# Patient Record
Sex: MALE | Race: WHITE | Employment: UNEMPLOYED | URBAN - METROPOLITAN AREA
[De-identification: names, ages, dates, MRNs, and addresses within clinical notes are randomized per-mention and may not be internally consistent; named-entity substitution may affect disease eponyms.]

---

## 2021-01-01 ENCOUNTER — HOSPITAL ENCOUNTER (EMERGENCY)
Age: 31
Discharge: HOME | End: 2021-01-01
Attending: EMERGENCY MEDICINE
Payer: MEDICARE

## 2021-01-01 VITALS
HEIGHT: 73 IN | WEIGHT: 165.79 LBS | BODY MASS INDEX: 21.97 KG/M2 | HEART RATE: 76 BPM | TEMPERATURE: 98.5 F | OXYGEN SATURATION: 100 % | DIASTOLIC BLOOD PRESSURE: 86 MMHG | SYSTOLIC BLOOD PRESSURE: 136 MMHG | RESPIRATION RATE: 16 BRPM

## 2021-01-01 DIAGNOSIS — K02.9 DENTAL CARIES: Primary | ICD-10-CM

## 2021-01-01 PROCEDURE — 99283 EMERGENCY DEPT VISIT LOW MDM: CPT

## 2021-01-01 RX ORDER — IBUPROFEN 600 MG/1
600 TABLET ORAL EVERY 8 HOURS PRN
Qty: 20 TABLET | Refills: 0 | Status: SHIPPED | OUTPATIENT
Start: 2021-01-01

## 2021-01-01 RX ORDER — CLINDAMYCIN HYDROCHLORIDE 300 MG/1
300 CAPSULE ORAL 3 TIMES DAILY
Qty: 30 CAPSULE | Refills: 0 | Status: SHIPPED | OUTPATIENT
Start: 2021-01-01 | End: 2021-01-11

## 2021-01-01 NOTE — ED NOTES
Walked pt from [facility] to ED bed. Obtained VS. Pt wearing mask, medic wearing mask, gloves, safety glasses.      Mina Dominguez, EMT-P  01/01/21 1116

## 2021-01-01 NOTE — ED NOTES
Pt wearing mask. Staff wearing procedural mask and eye protection (helmet or goggles) for staff protection-COVID 19    Pt had tooth that broke on left side about 1 month ago. Then states he pulled out a filling while flossing about 2 weeks ago. Not having pain because he thinks he had a root canal done. Pt does feel like he has had small pus pockets develop and drain around left upper and lower teeth recently. Pt states he went to [facility] dentist and was told that he needed lots of dental work done/fillings replaced. No antibiotics were prescribed. Has been taking ibuprofen. Reports some chills/feeling feverish, headaches. Has not had definite COVID exposure. Lots of COVID teaching and dental pain/dental care teaching with pt. Took ibuprofen earlier this am for HA/feeling feverish. Now afebrile.      Tiffany Corley RN  01/01/21 3686

## 2021-01-01 NOTE — ED PROVIDER NOTES
eMERGENCY dEPARTMENT eNCOUnter      Pt Name: Emma Alejandre  MRN: [id number]  [place] 1990  Date of evaluation: 1/1/2021  Provider: Fernandez Espinoza MD     [address] [place] [address]       Chief Complaint   Patient presents with    Dental Problem     Pt states infection to L upper molar x2wks, denies pain currently. HISTORY OF PRESENT ILLNESS   (Location/Symptom, Timing/Onset,Context/Setting, Quality, Duration, Modifying Factors, Severity) Note limiting factors. HPI    Emma Alejandre is a 31 y.o. male who presents to the emergency department with dental issues. Patient states he has root canals and one of the A filling came off while he was flossing on the left upper jaw and now he feels like there is some pus that is draining. He thinks he is has an infection. But no pain or swelling that he knows of. On the gum area on the inside on the left upper jaw there is some swelling and some mucus pus that drained. There has been no fever. Patient states he has no jaw pain. No swelling that he knows of. Patient request some antibiotics. Has been going on for couple weeks. Patient has a dental appointment in February. Patient has been seen by a dentist recently. Nursing Notes were reviewed. REVIEW OFSYSTEMS    (2+ for level 4; 10+ for level 5)   Review of Systems    General: No fevers, chills or night sweats, No weight loss    Head:  No Sore throat,  No Ear Pain. Positive drainage. Chest:  Nontender. No Cough, No SOB,  Chest Pain    GI: No abdominal pain or vomiting    : No dysuria or hematuria    Musculoskeletal: No unrelenting pain or night pain    Neurologic: No bowel or bladder incontinence, No saddle anesthesia, No leg weakness    All other systems reviewed and are negative. PAST MEDICAL HISTORY     Past Medical History:   Diagnosis Date    Hepatitis C        SURGICAL HISTORY     History reviewed. No pertinent surgical history.     CURRENT MEDICATIONS       Previous Medications ACETAMINOPHEN (TYLENOL) 325 MG TABLET    Take 650 mg by mouth every 6 hours as needed for Pain    IBUPROFEN (ADVIL;MOTRIN) 600 MG TABLET    Take 1 tablet by mouth every 6 hours as needed for Pain       ALLERGIES     Pcn [penicillins]    FAMILY HISTORY     History reviewed. No pertinent family history. SOCIAL HISTORY       Social History     Socioeconomic History    Marital status: Single     Spouse name: None    Number of children: None    Years of education: None    Highest education level: None   Occupational History    None   Social Needs    Financial resource strain: None    Food insecurity     Worry: None     Inability: None    Transportation needs     Medical: None     Non-medical: None   Tobacco Use    Smoking status: Current Some Day Smoker     Packs/day: 0.75     Types: Cigarettes    Smokeless tobacco: Never Used   Substance and Sexual Activity    Alcohol use: Yes     Comment: once a month    Drug use: Yes     Types: Methamphetamines     Comment: 1/1/21 last meth use 10 months    Sexual activity: None   Lifestyle    Physical activity     Days per week: None     Minutes per session: None    Stress: None   Relationships    Social connections     Talks on phone: None     Gets together: None     Attends Judaism service: None     Active member of club or organization: None     Attends meetings of clubs or organizations: None     Relationship status: None    Intimate partner violence     Fear of current or ex partner: None     Emotionally abused: None     Physically abused: None     Forced sexual activity: None   Other Topics Concern    None   Social History Narrative    None       SCREENINGS           PHYSICAL EXAM    (up to 7 for level 4, 8 or more for level 5)     ED Triage Vitals [01/01/21 1113]   BP Temp Temp Source Pulse Resp SpO2 Height Weight   136/86 98.5 °F (36.9 °C) Oral 76 16 100 % 6' 1\" (1.854 m) 165 lb 12.6 oz (75.2 kg)       Physical Exam    General: Alert and awake ×3. Nontoxic appearance. Well-developed well-nourished 69-year-old white male in no distress  HEENT: Normocephalic atraumatic. Neck is supple. Airway intact. No adenopathy. Patient has some good dentition with cavities noted. Also has a open cavity without any filling on the left upper jaw probably the second molar. In that area on the inner aspect of the mucosal there is a blister noted. It is near on the inner gum area. No pus that I can see at this time. Not tender. Uvula is midline. No abscess. Cardiac: Regular rate and rhythm with no murmurs rubs or gallops  Pulmonary: Lungs are clear in all lung fields. No wheezing. No Rales. Abdomen: Soft and nontender. Negative hepatosplenomegaly. Bowel sounds are active  Extremities: Moving all extremities. No calf tenderness. Peripheral pulses all intact  Skin: No skin lesions. No rashes  Neurologic: Cranial nerves II through XII was grossly intact. Nonfocal neurological exam  Psychiatric: Patient is pleasant. Mood is appropriate. DIAGNOSTIC RESULTS     EKG (Per Emergency Physician):       RADIOLOGY (Per Emergency Physician): Interpretation per the Radiologist below, if available at the time of this note:  No results found. ED BEDSIDE ULTRASOUND:   Performed by ED Physician - none    LABS:  Labs Reviewed - No data to display     All other labs were within normal range or not returned as of this dictation. Procedures      EMERGENCY DEPARTMENT COURSE and DIFFERENTIAL DIAGNOSIS/MDM:   Vitals:    Vitals:    01/01/21 1113   BP: 136/86   Pulse: 76   Resp: 16   Temp: 98.5 °F (36.9 °C)   TempSrc: Oral   SpO2: 100%   Weight: 165 lb 12.6 oz (75.2 kg)   Height: 6' 1\" (1.854 m)       Medications - No data to display    MDM. Patient is 69-year-old with some dental issues. Patient has been to a dentist.  Patient has some drainage. I will prescribe clindamycin and some ibuprofen at this time. Patient will follow-up with Wilver stock. Patient should follow-up in 2 weeks. Patient discharged in good condition. REVAL:         CRITICAL CARE TIME   Total CriticalCare time was 0 minutes, excluding separately reportable procedures. There was a high probability of clinically significant/life threatening deterioration in the patient's condition which required my urgent intervention. CONSULTS:  None    PROCEDURES:  Unless otherwise noted below, none     [unfilled]    FINAL IMPRESSION      1. Dental caries          DISPOSITION/PLAN   DISPOSITION Decision To Discharge 01/01/2021 11:33:40 AM      PATIENT REFERRED TO:  Wilver stock    Schedule an appointment as soon as possible for a visit in 2 weeks  If symptoms worsen      DISCHARGE MEDICATIONS:  New Prescriptions    CLINDAMYCIN (CLEOCIN) 300 MG CAPSULE    Take 1 capsule by mouth 3 times daily for 10 days    IBUPROFEN (IBU) 600 MG TABLET    Take 1 tablet by mouth every 8 hours as needed for Pain          (Please note:  Portions of this note were completed with a voice recognition program.Efforts were made to edit the dictations but occasionally words and phrases are mis-transcribed.)  Form v2016. J.5-cn    LUIS DIEZ MD (electronically signed)  Emergency Medicine Provider        Rosaura Andrew MD  01/01/21 7622

## 2021-01-14 NOTE — ED NOTES
Discharge instructions with pt. Explained rx's. Dental pain teaching with pt. List of dental clinics given. Another [facility] referral given. No pain. Explained importance of having a family doctor for regular medical care. Explained how to get a family doctor. [place] [facility] info sheet given.  [facility] clinic list given. [facility] referral line given. PRIYANKA Dudley , phone number listed in case pt needs any further assistance.      Donnie Granados RN  01/14/21 3416

## 2023-12-18 ENCOUNTER — OFFICE VISIT (OUTPATIENT)
Dept: PRIMARY CARE | Facility: CLINIC | Age: 33
End: 2023-12-18
Payer: COMMERCIAL

## 2023-12-18 VITALS
HEIGHT: 74 IN | SYSTOLIC BLOOD PRESSURE: 138 MMHG | DIASTOLIC BLOOD PRESSURE: 84 MMHG | BODY MASS INDEX: 20.28 KG/M2 | WEIGHT: 158 LBS

## 2023-12-18 DIAGNOSIS — M54.16 LUMBAR RADICULOPATHY: Primary | ICD-10-CM

## 2023-12-18 PROCEDURE — 96372 THER/PROPH/DIAG INJ SC/IM: CPT

## 2023-12-18 PROCEDURE — 99213 OFFICE O/P EST LOW 20 MIN: CPT | Performed by: PHYSICIAN ASSISTANT

## 2023-12-18 PROCEDURE — 2500000004 HC RX 250 GENERAL PHARMACY W/ HCPCS (ALT 636 FOR OP/ED): Performed by: PHYSICIAN ASSISTANT

## 2023-12-18 PROCEDURE — 96372 THER/PROPH/DIAG INJ SC/IM: CPT | Performed by: PHYSICIAN ASSISTANT

## 2023-12-18 RX ORDER — CYCLOBENZAPRINE HCL 10 MG
10 TABLET ORAL 3 TIMES DAILY PRN
Qty: 30 TABLET | Refills: 0 | Status: SHIPPED | OUTPATIENT
Start: 2023-12-18 | End: 2024-02-16

## 2023-12-18 RX ORDER — PREDNISONE 10 MG/1
TABLET ORAL
Qty: 30 TABLET | Refills: 0 | Status: SHIPPED | OUTPATIENT
Start: 2023-12-18 | End: 2023-12-28

## 2023-12-18 RX ORDER — METHYLPREDNISOLONE ACETATE 40 MG/ML
40 INJECTION, SUSPENSION INTRA-ARTICULAR; INTRALESIONAL; INTRAMUSCULAR; SOFT TISSUE ONCE
Status: COMPLETED | OUTPATIENT
Start: 2023-12-18 | End: 2023-12-18

## 2023-12-18 RX ORDER — KETOROLAC TROMETHAMINE 30 MG/ML
60 INJECTION, SOLUTION INTRAMUSCULAR; INTRAVENOUS ONCE
Status: COMPLETED | OUTPATIENT
Start: 2023-12-18 | End: 2023-12-18

## 2023-12-18 RX ADMIN — METHYLPREDNISOLONE ACETATE 40 MG: 40 INJECTION, SUSPENSION INTRA-ARTICULAR; INTRALESIONAL; INTRAMUSCULAR; SOFT TISSUE at 11:50

## 2023-12-18 RX ADMIN — KETOROLAC TROMETHAMINE 60 MG: 30 INJECTION, SOLUTION INTRAMUSCULAR at 11:49

## 2023-12-18 ASSESSMENT — PAIN SCALES - GENERAL: PAINLEVEL: 10-WORST PAIN EVER

## 2023-12-18 ASSESSMENT — PATIENT HEALTH QUESTIONNAIRE - PHQ9
2. FEELING DOWN, DEPRESSED OR HOPELESS: NOT AT ALL
1. LITTLE INTEREST OR PLEASURE IN DOING THINGS: NOT AT ALL
SUM OF ALL RESPONSES TO PHQ9 QUESTIONS 1 AND 2: 0

## 2023-12-18 ASSESSMENT — ENCOUNTER SYMPTOMS: BACK PAIN: 1

## 2023-12-18 NOTE — PATIENT INSTRUCTIONS
Recommend PT eval.   MRI if symptoms worsen or do not resolve. ER with loss of bowel/bladder function.

## 2023-12-18 NOTE — PROGRESS NOTES
"Subjective   Patient ID: Garrett Spain is a 33 y.o. male who presents for Back Pain (Patient states he re-injured his back last night. Patient states he had a previous injury. ).    C/o R sided low back pain and leg pain x2 days. States this got significantly worse after coughing. Has hx of back issues/degenerative changes - MRI from 2011 shows disc herniation. Has not done are treatment most recently.     Back Pain       Review of Systems   Musculoskeletal:  Positive for back pain.   All other systems reviewed and are negative.      Objective   /84   Ht 1.88 m (6' 2\")   Wt 71.7 kg (158 lb)   BMI 20.29 kg/m²     Physical Exam  Constitutional:       Comments: In pain sitting   Musculoskeletal:      Comments: Difficult to assess; poor ROM, tender lower back and weakness to leg   Neurological:      Mental Status: He is alert.       Assessment/Plan   Diagnoses and all orders for this visit:  Lumbar radiculopathy  -     ketorolac (Toradol) injection 60 mg  -     methylPREDNISolone acetate (DEPO-Medrol) injection 40 mg  -     predniSONE (Deltasone) 10 mg tablet; Take 5 tablets (50 mg) by mouth once daily for 2 days, THEN 4 tablets (40 mg) once daily for 2 days, THEN 3 tablets (30 mg) once daily for 2 days, THEN 2 tablets (20 mg) once daily for 2 days, THEN 1 tablet (10 mg) once daily for 2 days.  -     cyclobenzaprine (Flexeril) 10 mg tablet; Take 1 tablet (10 mg) by mouth 3 times a day as needed for muscle spasms.  -     Referral to Physical Therapy; Future         "

## 2023-12-21 ENCOUNTER — EVALUATION (OUTPATIENT)
Dept: PHYSICAL THERAPY | Facility: CLINIC | Age: 33
End: 2023-12-21
Payer: COMMERCIAL

## 2023-12-21 DIAGNOSIS — M54.9 BACK PAIN: Primary | ICD-10-CM

## 2023-12-21 DIAGNOSIS — M54.16 LUMBAR RADICULOPATHY: ICD-10-CM

## 2023-12-21 PROCEDURE — 97162 PT EVAL MOD COMPLEX 30 MIN: CPT | Mod: GP | Performed by: PHYSICAL THERAPIST

## 2023-12-21 PROCEDURE — 97110 THERAPEUTIC EXERCISES: CPT | Mod: GP | Performed by: PHYSICAL THERAPIST

## 2023-12-21 ASSESSMENT — ENCOUNTER SYMPTOMS
LOSS OF SENSATION IN FEET: 0
DEPRESSION: 0
OCCASIONAL FEELINGS OF UNSTEADINESS: 0

## 2023-12-21 NOTE — PROGRESS NOTES
Physical Therapy Evaluation    Patient Name: Garrett Spain  MRN: [id number]  Evaluation Date: 12/21/2023  Time Calculation  Start Time: 1517  Stop Time: 1552  Time Calculation (min): 35 min      Subjective   General:       Patient reported hx of injury: chronic LBP since he was 13-skateboard accident, but recent exacerbation after coughing, with severe pain.  Pt is Getting better.  Pt gets exacerbations and feels like back slips out.  Last PT was 10 years ago.  Pt does flexion stretches historically to loosen up.  Pt wants an MRI.  Pt has not lied on stomach for years-pain.    Surgery:   N/A  Red Flags:  none    Precautions:   Ease into extension slowly, over time.  Pain:   Range is 3-10 in last week or so.  Right now a 4/10.  Home Living:     Pt gets around home safely  Home type: House  Stairs: Yes  Lives with: Spouse    Work:  full time.  , fabricator own business, as well making PC's, etc.    Prior Function Per Pt/Caregiver Report:   Pt with ongoing back pain, but was able to work, move quickly, lift, run a little playing with son with less pain  Pt goals: less pain  Imaging:  None recent    OBJECTIVE:  Objective   Angela Lumbar Assessment:    S/S WORSE:  lifting, bending.  S/S BETTER:  steroids and mm relaxers  POSTURE:  significant PPT while seated.  Stands with absent lordosis.  POSTURE CORRECTION:  NE but L roll feels good  Pt reports their typical sitting surface is a desk chair with wheels.  Pt reports they are stiff getting up from chair:  yes      Myotomes R L   Hip Flexion 5/5 5/5   Knee Extension 5/5 5/5     NE=No effect, C=centralize, A=abolish, p=peripheralize, P=produce, B=better, W=worse, D=decrease, I=increase, NW=no worse, NB=no better     TTP R L paraspinal    Gait:   Mildly antalgic, decr bladimir  Balance:   Wfl grossly       Other:       Outcome Measures:    Oswestry=26%    OP EDUCATION:   HEP, avoid all lumbar flexion stretches at this time    Today's Treatment, 8':  HEP to be completed  "daily, exercises include:  Prone over pillow static  Posture correction with lumbar support  Active posture correction    Assessment       pt is s/p exacerbation of LBP and needs PT to incr ROM, strength, flexibility, improve posture and decr pain to restore function and allow RTW.    Plan     Skilled PT consisting of:  Aquatics, therapeutic exercise, therapeutic activity, NMR, , manual, thermal, electric stimulation, US, light therapy, gait training, transfer training, dry needle, mechanical traction prn and if OK by PT only.   Rehab Potential: fair-acute on chronic  Frequency:  2x/wk  Duration:  12 weeks    Goals:    STG:   Pt to be I in initial HEP.  Pt to be I in posture correction.    LTG\"s:  Incr MMT of hips and core by 1 grade for less strain with lifting/bending.  Improve score on outcome form by 10 points to show incr in function.  Incr ROM of trunk to WFL for dect strain with IADL.  Decr max pain level to 5 for RTW.        "

## 2024-01-04 ENCOUNTER — TREATMENT (OUTPATIENT)
Dept: PHYSICAL THERAPY | Facility: CLINIC | Age: 34
End: 2024-01-04
Payer: COMMERCIAL

## 2024-01-04 DIAGNOSIS — M54.9 BACK PAIN: ICD-10-CM

## 2024-01-04 DIAGNOSIS — M54.16 LUMBAR RADICULOPATHY: ICD-10-CM

## 2024-01-04 PROCEDURE — 97530 THERAPEUTIC ACTIVITIES: CPT | Mod: GP | Performed by: PHYSICAL THERAPIST

## 2024-01-04 PROCEDURE — 97110 THERAPEUTIC EXERCISES: CPT | Mod: GP | Performed by: PHYSICAL THERAPIST

## 2024-01-04 NOTE — PROGRESS NOTES
Physical Therapy Treatment    Patient Name: Garrett Spain  MRN: [id number]  Encounter date:  2024  Time Calculation  Start Time: 1650  Stop Time: 1730  Time Calculation (min): 40 min     PT Therapeutic Procedures Time Entry  Therapeutic Exercise Time Entry: 10  Therapeutic Activity Time Entry: 30    Visit Number:  2 (including evaluation)  Planned total visits: 10  Visit Authorized:      Current Problem  1. Lumbar radiculopathy  Follow Up In Physical Therapy      2. Back pain  Follow Up In Physical Therapy          Precautions   Precautions:   Ease into extension slowly, over time.    Pain   0    Subjective  General        Pt is done with steroids, stopped taking mm relaxer due to HA with this.  max pain in last week is a 4 or 5, and is with bending down    Objective  Stand posture: pt now has some normal lordosis, previously was with reverse lordosis  FIS min decr, EIS min decr    Treatment:     TE's:  Prone lye 1' x 2, prone on elbows 2 x 1'-cues to sag abdomen to incr ext  Slouch correct x 3-multiple cues and demo to achieve good form     Therapeutic activity consisting of:  Pt ed on correct sitting posture including use of lumbar support to decr s/s.    Pt ed on anatomy of spine and what happens at the spine with flexion vs. Extension.  Pt ed on how to find correct posture  Mechanical assessment to confirm extension preference      OP EDUCATION:   Tiana prone on elbows    Assessment:  Provisional diagnosis confirmed, pt is post derangement with extension preference.  Pt no longer needs pillow under abdomen lying prone, and in fact advanced to LIE (prone on elbows).  Incr spine ext ROM  Pain end of session:  0    Plan:     Continue with current POC/no changes, focus on posture correction and extension    Assessment of current progress against goals:  Progressing toward functional goals    Goals:  Active       Balance       ST.  Pt to be I in initial HEP. 2. Pt to be I in posture correction.        Start:   "12/21/23    Expected End:  03/14/24            LTG\"s: 1.Incr MMT of hips and core by 1 grade for less strain with lifting/bending. 2.Improve score on outcome form by 10 points to show incr in function.    3.Incr ROM of trunk to WFL for dect strain with IADL. 4.Decr max pain level to 5 for RTW.       Start:  12/21/23    Expected End:  03/14/24                         "

## 2024-01-11 ENCOUNTER — DOCUMENTATION (OUTPATIENT)
Dept: PHYSICAL THERAPY | Facility: CLINIC | Age: 34
End: 2024-01-11
Payer: COMMERCIAL

## 2024-01-11 NOTE — PROGRESS NOTES
Physical Therapy                 Therapy Communication Note    Patient Name: Garrett Spain  MRN: [id number]  Today's Date: 1/11/2024     Discipline: Physical Therapy    Missed Visit Reason:      Missed Time: No Show    Comment:

## 2024-01-25 ENCOUNTER — DOCUMENTATION (OUTPATIENT)
Dept: PHYSICAL THERAPY | Facility: CLINIC | Age: 34
End: 2024-01-25
Payer: COMMERCIAL

## 2024-01-25 NOTE — PROGRESS NOTES
Physical Therapy                 Therapy Communication Note    Patient Name: Garrett Spain  MRN: [id number]  Today's Date: 1/25/2024     Discipline: Physical Therapy    Missed Visit Reason:      Missed Time: No Show    Comment:Called pt, unable to leave message, pt did not return call.  Plan to cx future visits due to NS's.

## 2024-02-01 ENCOUNTER — DOCUMENTATION (OUTPATIENT)
Dept: PHYSICAL THERAPY | Facility: CLINIC | Age: 34
End: 2024-02-01
Payer: COMMERCIAL

## 2024-02-01 NOTE — PROGRESS NOTES
Physical Therapy    Discharge Summary    Name: Garrett Spain  MRN: [id number]  : 1990  Date: 24    Discharge Summary: PT    Discharge Information: Date of discharge 24    Therapy Summary: at last visit on 24, pain was 0, pt had incr trunk ext ROM, improved posture    Discharge Status: unknown     Rehab Discharge Reason: Failed to schedule and/or keep follow-up appointment(s)

## 2024-02-01 NOTE — PROGRESS NOTES
Physical Therapy                 Therapy Communication Note    Patient Name: Garrett Spain  MRN: [id number]  Today's Date: 2/1/2024     Discipline: Physical Therapy    Missed Visit Reason:      Missed Time: No Show    Comment: Pt n/s again for tx, will plan to discharge PT due to no shows.

## 2024-12-06 ENCOUNTER — OFFICE VISIT (OUTPATIENT)
Dept: PRIMARY CARE | Facility: CLINIC | Age: 34
End: 2024-12-06
Payer: COMMERCIAL

## 2024-12-06 VITALS
DIASTOLIC BLOOD PRESSURE: 78 MMHG | BODY MASS INDEX: 21.07 KG/M2 | WEIGHT: 159 LBS | SYSTOLIC BLOOD PRESSURE: 142 MMHG | HEART RATE: 74 BPM | HEIGHT: 73 IN | OXYGEN SATURATION: 97 %

## 2024-12-06 DIAGNOSIS — S46.011D ROTATOR CUFF STRAIN, RIGHT, SUBSEQUENT ENCOUNTER: ICD-10-CM

## 2024-12-06 DIAGNOSIS — Z00.00 PHYSICAL EXAM: Primary | ICD-10-CM

## 2024-12-06 PROCEDURE — 99395 PREV VISIT EST AGE 18-39: CPT | Performed by: PHYSICIAN ASSISTANT

## 2024-12-06 PROCEDURE — 3008F BODY MASS INDEX DOCD: CPT | Performed by: PHYSICIAN ASSISTANT

## 2024-12-06 ASSESSMENT — PAIN SCALES - GENERAL: PAINLEVEL_OUTOF10: 0-NO PAIN

## 2024-12-06 ASSESSMENT — PATIENT HEALTH QUESTIONNAIRE - PHQ9
2. FEELING DOWN, DEPRESSED OR HOPELESS: NOT AT ALL
SUM OF ALL RESPONSES TO PHQ9 QUESTIONS 1 AND 2: 0
1. LITTLE INTEREST OR PLEASURE IN DOING THINGS: NOT AT ALL

## 2024-12-06 NOTE — PROGRESS NOTES
"Subjective   Patient ID: Garrett Spain is a 34 y.o. male who presents for Annual Exam.    Garrett is seen for RPE.   Denies changes in health or concerns.   Hx of HCV cleared after Harvoni tx.     Due for routine labs.   Denies change in FH.         Review of Systems   All other systems reviewed and are negative.      Objective   /78   Pulse 74   Ht 1.854 m (6' 1\")   Wt 72.1 kg (159 lb)   SpO2 97%   BMI 20.98 kg/m²     Physical Exam  Constitutional:       General: He is not in acute distress.     Appearance: Normal appearance.   HENT:      Right Ear: Tympanic membrane and ear canal normal.      Left Ear: Tympanic membrane and ear canal normal.      Mouth/Throat:      Pharynx: Oropharynx is clear. No posterior oropharyngeal erythema.   Eyes:      Extraocular Movements: Extraocular movements intact.      Pupils: Pupils are equal, round, and reactive to light.   Cardiovascular:      Rate and Rhythm: Normal rate and regular rhythm.      Heart sounds: Normal heart sounds.   Pulmonary:      Breath sounds: Normal breath sounds.   Abdominal:      General: Bowel sounds are normal.      Palpations: Abdomen is soft.      Tenderness: There is no abdominal tenderness.   Musculoskeletal:         General: Normal range of motion.      Cervical back: Neck supple.   Skin:     Findings: No rash.   Neurological:      Mental Status: He is alert.         Assessment/Plan   Diagnoses and all orders for this visit:  Physical exam  -     CBC and Auto Differential; Future  -     Comprehensive metabolic panel; Future  -     Lipid panel; Future  Rotator cuff strain, right, subsequent encounter         "